# Patient Record
Sex: MALE | Race: OTHER | Employment: FULL TIME | URBAN - METROPOLITAN AREA
[De-identification: names, ages, dates, MRNs, and addresses within clinical notes are randomized per-mention and may not be internally consistent; named-entity substitution may affect disease eponyms.]

---

## 2023-11-24 ENCOUNTER — HOSPITAL ENCOUNTER (EMERGENCY)
Age: 33
Discharge: HOME | End: 2023-11-24
Attending: EMERGENCY MEDICINE

## 2023-11-24 VITALS
HEART RATE: 90 BPM | DIASTOLIC BLOOD PRESSURE: 77 MMHG | BODY MASS INDEX: 21.22 KG/M2 | WEIGHT: 140 LBS | HEIGHT: 68 IN | TEMPERATURE: 98.1 F | OXYGEN SATURATION: 98 % | RESPIRATION RATE: 16 BRPM | SYSTOLIC BLOOD PRESSURE: 121 MMHG

## 2023-11-24 DIAGNOSIS — Z22.7 LATENT TUBERCULOSIS: Primary | ICD-10-CM

## 2023-11-24 PROCEDURE — 99283 EMERGENCY DEPT VISIT LOW MDM: CPT

## 2023-11-24 RX ORDER — RIFAMPIN 150 MG/1
600 CAPSULE ORAL DAILY
Qty: 360 CAPSULE | Refills: 0 | Status: SHIPPED | OUTPATIENT
Start: 2023-11-24 | End: 2024-02-22

## 2023-11-24 ASSESSMENT — ENCOUNTER SYMPTOMS
ABDOMINAL PAIN: 0
SHORTNESS OF BREATH: 0
COLOR CHANGE: 0
COUGH: 0
CHEST TIGHTNESS: 0

## 2023-11-24 NOTE — ED PROVIDER NOTES
I independently examined and evaluated Lucian Morning. In brief, 30yom presents with complaint of testing positive for TB in October in [place]. Was set up to start treatment in November, but moved here because of better job opportunity. Had positive quantiferon, negative CXR, was deemed latent TB per documentation he has with him. Focused exam revealed acute distress, regular rate rhythm, nonlabored respirations. Soft and nondistended. No peripheral edema. He is alert and oriented. Koki Monteiro CC/HPI Summary, DDx, ED Course, and Reassessment: Patient is completely asymptomatic, appears that he has likely latent TB. He has an x-ray dated 10/13/2023 with the documentation that shows normal chest exam and no evidence of pulmonary TB. He has a positive quant here on results from 10/7/2023. Was done because of his and his immigration and the country. He will require treatment but he does not require hospitalization at this time. Will place a case management consult for outpatient management. History from : Patient    Limitations to history : None    Patient was given the following medications:  Medications - No data to display        Social Determinants : Patient lacks transportation and currently has [place] and it will need to be transferred to [place]. Disposition Considerations (tests considered but not done, Shared Decision Making, Pt Expectation of Test or Tx.):     We have sent prescription to our outpatient pharmacy, they closed at 4, Trellis Maria Teresa our pharmacist did call and we are discharging him now to be able to go over there so that they can access his insurance and get all of that done. He will be prescribed antibiotics, case management will call him next week and I did discuss this with him. Discharge condition: stable    I am the Primary Clinician of Record.           All diagnostic, treatment, and disposition decisions were made by myself in conjunction with the

## 2023-11-24 NOTE — ED NOTES
Pt states he recently moved here from [place]. Reports that he tested positive for TB in October, was not able to get treatment due to moving here. Pt states that he has no complaints or pain, just wants to get treatment.       Aundrea Disla  11/24/23 [id number]

## 2023-11-24 NOTE — ED PROVIDER NOTES
[address] ENCOUNTER      Pt Name: Jazmine Pineda  MRN: [id number]  [address] [facility] 1990  Date of evaluation: 11/24/2023  Provider: SUSAN Sorensen CNP  PCP: No primary care provider on file. Note Started: 3:24 PM EST 11/24/23    I am the Primary Clinician of Record. I have seen and evaluated this patient with my supervising physician No att. providers found. CHIEF COMPLAINT       Chief Complaint   Patient presents with    states he is TB positive and needs treatment. HISTORY OF PRESENT ILLNESS: 1 or more Elements   Jazmine Pineda is a 27 y.o. male who presents to the ER requesting treatment for TB. He states that he recently located to [place] from [place]. He was diagnosed in [place] with latent TB. Prior to having treatment initiated he decided to move to [address] [place] [address] because there were more job opportunities here. Patient states that he is having no shortness of breath, no cough, no chest pain, back pain or shortness of breath. He has not had no fevers. He has no hemoptysis. The testing was done as part of his immigration package coming to the [facility]. I have reviewed the nursing triage documentation and agree unless otherwise noted. REVIEW OF SYSTEMS :    Review of Systems   Constitutional:  Negative for chills, fatigue and fever. HENT:  Negative for congestion. Respiratory:  Negative for cough, chest tightness and shortness of breath. Cardiovascular:  Negative for chest pain and leg swelling. Gastrointestinal:  Negative for abdominal pain. Genitourinary:  Negative for difficulty urinating and dysuria. Musculoskeletal:  Negative for myalgias. Skin:  Negative for color change and rash. Neurological:  Negative for dizziness, weakness and headaches. Psychiatric/Behavioral:  Negative for confusion. Positives and Pertinent negatives as per HPI.    SURGICAL

## 2024-04-17 ENCOUNTER — HOSPITAL ENCOUNTER (OUTPATIENT)
Dept: GENERAL RADIOLOGY | Age: 34
Discharge: HOME | End: 2024-04-17

## 2024-04-17 ENCOUNTER — HOSPITAL ENCOUNTER (OUTPATIENT)
Age: 34
Discharge: HOME | End: 2024-04-17

## 2024-04-17 DIAGNOSIS — Z11.1 SCREENING-PULMONARY TB: ICD-10-CM

## 2024-04-17 PROCEDURE — 71046 X-RAY EXAM CHEST 2 VIEWS: CPT
